# Patient Record
Sex: FEMALE | Race: WHITE | URBAN - METROPOLITAN AREA
[De-identification: names, ages, dates, MRNs, and addresses within clinical notes are randomized per-mention and may not be internally consistent; named-entity substitution may affect disease eponyms.]

---

## 2017-06-28 NOTE — PROGRESS NOTES
SUBJECTIVE:     Ingris is a 66 year old female wanting to establish care:   HPI:  PCP is infrequent. Sees Dr. Matty Singleton.  Working with alternative practitioners mostly.  Also has [facility] care management with Cheryl Raymond.  Two concerns:  1) Right knee pain- outer aspect of knee. No weight bearing pain.  Not a pain of motion of the knee.   2) Working with physical therapist at ortho rehab in [place] [facility]. Had a shoulder injury from Chiropractor. Poor lymph drainage. Lots of fight and flight response. Working on release.   History of health care:  - Lyme disease for 10 years.  Worked with Dr. Anton.  Lingering health problems.  Needs lots of sleep.     Chronic Fatigue and thinks she has MCAS [very limited with where she can go given reactions].  Has had bad experience with acupuncture and homeopathy.     Eats all organic and cooks     Worked in [place] for Five years and since January 2017 has moved back to [place]. He will start a new job [not a caretaker].   ROS:  General: fatigue  Head/Eyes: none  Ears/Nose/Throat: none  Cardiovascular: none  Respiratory: none  Gastrointestinal: none  Breast: none  Genitourinary: none  Musculoskeletal: knee pain  Skin: none  Neurological: none  Mental Health: none  Endocrine: none  PROBLEM LIST:  Patient Active Problem List   Diagnosis     ADRENAL INSUFFICIENCY     Hypothyroidism     Hyperlipidemia     Malaise and fatigue     RIGHT SHOULDER PAIN     Allergic rhinitis     SKIN ANOMALY NEC - skin tags     BENIGN AYLA SKIN TRUNK -seborrheic keratosis     Elevated blood pressure reading without diagnosis of hypertension     Essential hypertension     Lateral epicondylitis     JOINT PAIN-LOWER LEG -left iliotibial band     Leiomyoma of uterus     Family history of malignant neoplasm of gastrointestinal tract     Backache     Symptomatic menopausal or female climacteric states     Open wound of hip and thigh     Myalgia and myositis     Encephalopathy   OB/GYN HISTORY:    .  PAST MEDICAL HISTORY:  Past Medical History:   Diagnosis Date     Allergic rhinitis, cause unspecified allergist -[place]    nothing for the last year - chemical sensitivities, now using drops     Breast screening, unspecified      Corticoadrenal insufficiency     no bloodwork for 1 year, off steroids for about 9 months     Dysfunction of eustachian tube      Intestinal disaccharidase deficiencies and disaccharide malabsorption      Leiomyoma of uterus, unspecified      Lyme disease      Other and unspecified hyperlipidemia     borderline, not been on meds     Other disorder of menstruation and other abnormal bleeding from female genital tract 3/1998    no periods      Other malaise and fatigue      Pain in joint, shoulder region     b/l frozen shoulders, one unfrozen, right shoulder - frozen - still a problem not complete ROM     Pneumonia, organism      Special screening for osteoporosis      Syncope and collapse 1998    vasovagal reaction, tilt test - 2 episodes collapsing after 98 - same time of year May      Unspecified hypothyroidism    Life Style Modifiers:   Tobacco:  reports that she has never smoked. She has never used smokeless tobacco.   Alcohol:  reports that she does not drink alcohol.   Drug use:  reports that she does not use illicit drugs.                  Diet: organic  CAM Providers:      Naturopath: Yes, many herbals she cannot take    Chiropractor: Yes - shoulder injury    Acupuncture and homeopathy went poorly  PAST SURGICAL HISTORY:  Past Surgical History:   Procedure Laterality Date     C NONSPECIFIC PROCEDURE      hysteroscopy and US showed uterine fibroid     HC COLONOSCOPY THRU STOMA W BIOPSY/CAUTERY TUMOR/POLYP/LESION        REMOVE TONSILS/ADENOIDS,12+ Y/O     FAMILY HISTORY:  Family History   Problem Relation Age of Onset     Cardiovascular Father      aortic aneurysm -  in his 50s from the aneruysm     CANCER Mother      skin CA dx 70, alive currently in  "her 80s     Lipids Maternal Grandmother      dx 40's     Cancer - colorectal Maternal Grandfather      dx 60's     CANCER Paternal Grandfather      lung CA     CANCER Sister      skin CA dx 48     Depression Son      dx 20   SOCIAL HISTORY:  Son had TBI and knee injury and on full disability - age 33. Mold in her house.  Social History     Marital status:      Spouse name: Deni     Number of children: 1     Years of education: N/A     Occupational History     Retired       Social History Main Topics     Smoking status: Never Smoker     Smokeless tobacco: Never Used     Alcohol use No     Drug use: No     Sexual activity: Yes     Partners: Male     Birth control/ protection: Condom      Comment:   - Deni, son is Tom   MEDICATIONS:  Current Outpatient Prescriptions   Medication Sig Dispense Refill     Thyroid (NATURE-THROID) 65 MG TABS Take by mouth daily Take 1/4 tablet       ASA-APAP-Caff Buffered (VANQUISH PO) Take by mouth as needed       Probiotic Product (PROBIOTIC DAILY PO) Take by mouth daily       Licorice-Glycine (RHIZINATE 3X PO) Take 6 tablets by mouth daily       Digestive Enzymes (DIGEST II PO) Take 6 tablets by mouth daily       Cholecalciferol (VITAMIN D-3) 5000 UNITS TABS Take by mouth daily       vitamin A 26463 UNIT capsule Take 10,000 Units by mouth daily       Docosahexaenoic Acid (DHA OMEGA 3 PO) Take 900 mg by mouth daily       B Complex Vitamins (B COMPLEX PO) Take by mouth daily       Zinc 15 MG CAPS Take by mouth daily       Alpha Lipoic Acid 200 MG CAPS Take by mouth daily     ALLERGIES:  Compazine [prochlorperazine]; Ciprofloxacin; Clindamycin; Contrast dye; Lactose; Latex; Lincomycin hcl; Penicillins; Seasonal allergies; Shellfish allergy; Sulfa drugs; and Tetanus toxoid  VITALS:  Blood pressure 135/83, pulse 79, height 1.695 m (5' 6.75\"), weight 82.3 kg (181 lb 8 oz).  PHYSICAL EXAM:  Speech: regular rate and rhythm  Mood: appropriate mood  Affect: normal  Thought " process: appropriate  Psych motor changes: normal/none   30 minutes was spent in direct contact with the patient and > 50% of the time in patient education and coordination of care.  Diagnoses and associated orders for this visit:  Encounter to establish care      - consider annual/preventive exam - she will consider but likely would not do mammogram or immunizations.   Right knee pain, unspecified chronicity  -     SPORTS MEDICINE REFERRAL  Multiple chemical sensitivity syndrome, sequela        -     Works with PT for release        -     Ree Gutierrez - mold and mycotoxins

## 2017-06-29 ENCOUNTER — OFFICE VISIT (OUTPATIENT)
Dept: FAMILY MEDICINE | Facility: CLINIC | Age: 67
End: 2017-06-29
Attending: FAMILY MEDICINE
Payer: COMMERCIAL

## 2017-06-29 VITALS
DIASTOLIC BLOOD PRESSURE: 83 MMHG | BODY MASS INDEX: 28.49 KG/M2 | HEART RATE: 79 BPM | WEIGHT: 181.5 LBS | HEIGHT: 67 IN | SYSTOLIC BLOOD PRESSURE: 135 MMHG

## 2017-06-29 DIAGNOSIS — T78.40XS MULTIPLE CHEMICAL SENSITIVITY SYNDROME, SEQUELA: ICD-10-CM

## 2017-06-29 DIAGNOSIS — M25.561 RIGHT KNEE PAIN, UNSPECIFIED CHRONICITY: ICD-10-CM

## 2017-06-29 DIAGNOSIS — Z76.89 ENCOUNTER TO ESTABLISH CARE: Primary | ICD-10-CM

## 2017-06-29 PROCEDURE — 99213 OFFICE O/P EST LOW 20 MIN: CPT | Mod: ZF

## 2017-06-29 ASSESSMENT — PAIN SCALES - GENERAL: PAINLEVEL: NO PAIN (0)

## 2017-06-29 ASSESSMENT — ANXIETY QUESTIONNAIRES
GAD7 TOTAL SCORE: 2
2. NOT BEING ABLE TO STOP OR CONTROL WORRYING: NOT AT ALL
1. FEELING NERVOUS, ANXIOUS, OR ON EDGE: NOT AT ALL
5. BEING SO RESTLESS THAT IT IS HARD TO SIT STILL: NOT AT ALL
3. WORRYING TOO MUCH ABOUT DIFFERENT THINGS: NOT AT ALL
6. BECOMING EASILY ANNOYED OR IRRITABLE: MORE THAN HALF THE DAYS
7. FEELING AFRAID AS IF SOMETHING AWFUL MIGHT HAPPEN: NOT AT ALL

## 2017-06-29 ASSESSMENT — PATIENT HEALTH QUESTIONNAIRE - PHQ9: 5. POOR APPETITE OR OVEREATING: NOT AT ALL

## 2017-06-29 NOTE — PATIENT INSTRUCTIONS
[facility] sports medicine clinic [phone]: Denis; Fred Desai Morris, Olson, Stovitz and Sukhwinder    Continue with ortho rehab    Clarence Gutierrez on Molds: Mold & Mycotoxins: Current Evaluation and Treatment 2016

## 2017-06-29 NOTE — MR AVS SNAPSHOT
After Visit Summary   6/29/2017    Ingris Whitt    MRN: [id number]           Patient Information     Date Of Birth          1950        Visit Information        Provider Department      6/29/2017 10:40 AM Amy Lira MD [facility]        Today's Diagnoses     Encounter to establish care    -  1    Right knee pain, unspecified chronicity        Multiple chemical sensitivity syndrome, sequela          Care Instructions    [facility] sports medicine [facility] [phone]: Denis; Fred Desai Morris, Olson, Stovitz and Sukhwinder    Continue with ortho rehab    Clarence Gutierrez on Molds: Mold & Mycotoxins: Current Evaluation and Treatment 2016                 Follow-ups after your visit        Additional Services     SPORTS MEDICINE REFERRAL       Your provider has referred you to:  [facility]: Sports Medicine [facility] [phone]   [email or web address]    Please be aware that coverage of these services is subject to the terms and limitations of your health insurance plan.  Call member services at your health plan with any benefit or coverage questions.      Please bring the following to your appointment:    >>   Any x-rays, CTs or MRIs which have been performed.  Contact the facility where they were done to arrange for  prior to your scheduled appointment.    >>   List of current medications   >>   This referral request   >>   Any documents/labs given to you for this referral                  Who to contact     Please call your clinic at [phone] to:    Ask questions about your health    Make or cancel appointments    Discuss your medicines    Learn about your test results    Speak to your doctor   If you have compliments or concerns about an experience at your clinic, or if you wish to file a complaint, please contact [facility] Patient Relations at [phone] or email us at  [email or web address]         Additional Information About Your Visit        [employer] Information     [employer] gives you secure access to your electronic health record. If you see a primary care provider, you can also send messages to your care team and make appointments. If you have questions, please call your primary care clinic.  If you do not have a primary care provider, please call [phone] and they will assist you.      [employer] is an electronic gateway that provides easy, online access to your medical records. With [employer], you can request a clinic appointment, read your test results, renew a prescription or communicate with your care team.     To access your existing account, please contact your [facility] or call [phone] for assistance.        Care EveryWhere ID     This is your Care EveryWhere ID. This could be used by other organizations to access your [place] medical records  [id number]        Your Vitals Were     Pulse Height BMI (Body Mass Index)             79 1.695 m (5' 6.75\") 28.64 kg/m2          Blood Pressure from Last 3 Encounters:   06/29/17 135/83   06/11/14 116/68   03/05/14 132/84    Weight from Last 3 Encounters:   06/29/17 82.3 kg (181 lb 8 oz)   06/05/06 79.4 kg (175 lb)   06/01/06 78 kg (172 lb)              We Performed the Following     SPORTS MEDICINE REFERRAL        Primary Care Provider Office Phone # Fax #    Matty Singleton [phone]       INTERNAL MEDICINE [facility] [address] [place] [address]  [facility] [address]        Equal Access to Services     DA CHAPMAN : Hadii kaylin peng Soalie, waaxda luqadaha, qaybta kaalmada adina, ebony hudson. So [facility] [phone].    ATENCIÓN: Si habla español, tiene a verma disposición servicios gratuitos de asistencia lingüística. Llame al [phone].    We comply with applicable federal civil rights laws and [place] laws. We do not discriminate on " the basis of race, color, national origin, age, disability sex, sexual orientation or gender identity.            Thank you!     Thank you for choosing [facility]  for your care. Our goal is always to provide you with excellent care. Hearing back from our patients is one way we can continue to improve our services. Please take a few minutes to complete the written survey that you may receive in the mail after your visit with us. Thank you!             Your Updated Medication List - Protect others around you: Learn how to safely use, store and throw away your medicines at www.disposemymeds.org.          This list is accurate as of: 6/29/17 11:22 AM.  Always use your most recent med list.                   Brand Name Dispense Instructions for use Diagnosis    Alpha Lipoic Acid 200 MG Caps      Take by mouth daily        B COMPLEX PO      Take by mouth daily        DHA OMEGA 3 PO      Take 900 mg by mouth daily        DIGEST II PO      Take 6 tablets by mouth daily        NATURE-THROID 65 MG Tabs   Generic drug:  Thyroid      Take by mouth daily Take 1/4 tablet        PROBIOTIC DAILY PO      Take by mouth daily        RHIZINATE 3X PO      Take 6 tablets by mouth daily        VANQUISH PO      Take by mouth as needed        vitamin A 13770 UNIT capsule      Take 10,000 Units by mouth daily        Vitamin D-3 5000 UNITS Tabs      Take by mouth daily        Zinc 15 MG Caps      Take by mouth daily

## 2017-06-29 NOTE — LETTER
6/29/2017       RE: Ingris Whitt  [address]  [place] [place] [facility] [address]     Dear Colleague,    Thank you for referring your patient, Ingris Whitt, to the [facility] at [facility]. Please see a copy of my visit note below.    SUBJECTIVE:     Ingris is a 66 year old female wanting to establish care:   HPI:  PCP is infrequent. Sees Dr. Matty Singleton.  Working with alternative practitioners mostly.  Also has [facility] care management with Cheryl Raymond.  Two concerns:  1) Right knee pain- outer aspect of knee. No weight bearing pain.  Not a pain of motion of the knee.   2) [place] Had a shoulder injury from Chiropractor. Poor lymph drainage. Lots of fight and flight response. Working on release.   History of health care:  - Lyme disease for 10 years.  Worked with Dr. Anton.  Lingering health problems.  Needs lots of sleep.     Chronic Fatigue and thinks she has MCAS [very limited with where she can go given reactions].  Has had bad experience with acupuncture and homeopathy.     Eats all organic and cooks     Worked in [place] for Five years and since January 2017 has moved back to [place]. He will start a new job [not a caretaker].   ROS:  General: fatigue  Head/Eyes: none  Ears/Nose/Throat: none  Cardiovascular: none  Respiratory: none  Gastrointestinal: none  Breast: none  Genitourinary: none  Musculoskeletal: knee pain  Skin: none  Neurological: none  Mental Health: none  Endocrine: none  PROBLEM LIST:  Patient Active Problem List   Diagnosis     ADRENAL INSUFFICIENCY     Hypothyroidism     Hyperlipidemia     Malaise and fatigue     RIGHT SHOULDER PAIN     Allergic rhinitis     SKIN ANOMALY NEC - skin tags     BENIGN AYLA SKIN TRUNK -seborrheic keratosis     Elevated blood pressure reading without diagnosis of hypertension     Essential hypertension     Lateral epicondylitis     JOINT PAIN-LOWER  LEG -left iliotibial band     Leiomyoma of uterus     Family history of malignant neoplasm of gastrointestinal tract     Backache     Symptomatic menopausal or female climacteric states     Open wound of hip and thigh     Myalgia and myositis     Encephalopathy   OB/GYN HISTORY:   .  PAST MEDICAL HISTORY:  Past Medical History:   Diagnosis Date     Allergic rhinitis, cause unspecified allergist -[place]    nothing for the last year - chemical sensitivities, now using drops     Breast screening, unspecified      Corticoadrenal insufficiency     no bloodwork for 1 year, off steroids for about 9 months     Dysfunction of eustachian tube      Intestinal disaccharidase deficiencies and disaccharide malabsorption      Leiomyoma of uterus, unspecified      Lyme disease      Other and unspecified hyperlipidemia     borderline, not been on meds     Other disorder of menstruation and other abnormal bleeding from female genital tract 3/1998    no periods      Other malaise and fatigue      Pain in joint, shoulder region     b/l frozen shoulders, one unfrozen, right shoulder - frozen - still a problem not complete ROM     Pneumonia, organism      Special screening for osteoporosis      Syncope and collapse 1998    vasovagal reaction, tilt test - 2 episodes collapsing after 98 - same time of year May      Unspecified hypothyroidism    Life Style Modifiers:   Tobacco:  reports that she has never smoked. She has never used smokeless tobacco.   Alcohol:  reports that she does not drink alcohol.   Drug use:  reports that she does not use illicit drugs.                  Diet: organic  CAM Providers:      Naturopath: Yes, many herbals she cannot take    Chiropractor: Yes - shoulder injury    Acupuncture and homeopathy went poorly  PAST SURGICAL HISTORY:  Past Surgical History:   Procedure Laterality Date     C NONSPECIFIC PROCEDURE      hysteroscopy and US showed uterine fibroid     HC COLONOSCOPY THRU STOMA W  BIOPSY/CAUTERY TUMOR/POLYP/LESION        REMOVE TONSILS/ADENOIDS,12+ Y/O     FAMILY HISTORY:  Family History   Problem Relation Age of Onset     Cardiovascular Father      aortic aneurysm -  in his 50s from the aneruysm     CANCER Mother      skin CA dx 70, alive currently in her 80s     Lipids Maternal Grandmother      dx 40's     Cancer - colorectal Maternal Grandfather      dx 60's     CANCER Paternal Grandfather      lung CA     CANCER Sister      skin CA dx 48     Depression Son      dx 20   SOCIAL HISTORY:  Son had TBI and knee injury and on full disability - age 33. Mold in her house.  Social History     Marital status:      Spouse name: Deni     Number of children: 1     Years of education: N/A     Occupational History     Retired       Social History Main Topics     Smoking status: Never Smoker     Smokeless tobacco: Never Used     Alcohol use No     Drug use: No     Sexual activity: Yes     Partners: Male     Birth control/ protection: Condom      Comment:   - Deni, son is Tom   MEDICATIONS:  Current Outpatient Prescriptions   Medication Sig Dispense Refill     Thyroid (NATURE-THROID) 65 MG TABS Take by mouth daily Take 1/4 tablet       ASA-APAP-Caff Buffered (VANQUISH PO) Take by mouth as needed       Probiotic Product (PROBIOTIC DAILY PO) Take by mouth daily       Licorice-Glycine (RHIZINATE 3X PO) Take 6 tablets by mouth daily       Digestive Enzymes (DIGEST II PO) Take 6 tablets by mouth daily       Cholecalciferol (VITAMIN D-3) 5000 UNITS TABS Take by mouth daily       vitamin A 28344 UNIT capsule Take 10,000 Units by mouth daily       Docosahexaenoic Acid (DHA OMEGA 3 PO) Take 900 mg by mouth daily       B Complex Vitamins (B COMPLEX PO) Take by mouth daily       Zinc 15 MG CAPS Take by mouth daily       Alpha Lipoic Acid 200 MG CAPS Take by mouth daily     ALLERGIES:  Compazine [prochlorperazine]; Ciprofloxacin; Clindamycin; Contrast dye; Lactose; Latex; Lincomycin  "hcl; Penicillins; Seasonal allergies; Shellfish allergy; Sulfa drugs; and Tetanus toxoid  VITALS:  Blood pressure 135/83, pulse 79, height 1.695 m (5' 6.75\"), weight 82.3 kg (181 lb 8 oz).  PHYSICAL EXAM:  Speech: regular rate and rhythm  Mood: appropriate mood  Affect: normal  Thought process: appropriate  Psych motor changes: normal/none   30 minutes was spent in direct contact with the patient and > 50% of the time in patient education and coordination of care.  Diagnoses and associated orders for this visit:  Encounter to establish care      - consider annual/preventive exam - she will consider but likely would not do mammogram or immunizations.   Right knee pain, unspecified chronicity  -     SPORTS MEDICINE REFERRAL  Multiple chemical sensitivity syndrome, sequela        -     Works with PT for release        -     Read Clarence Gutierrez - mold and mycotoxins    Again, thank you for allowing me to participate in the care of your patient.      Sincerely,    Amy Lira MD      "

## 2017-06-30 ASSESSMENT — ANXIETY QUESTIONNAIRES: GAD7 TOTAL SCORE: 2

## 2017-06-30 ASSESSMENT — PATIENT HEALTH QUESTIONNAIRE - PHQ9: SUM OF ALL RESPONSES TO PHQ QUESTIONS 1-9: 6

## 2017-07-01 ENCOUNTER — HEALTH MAINTENANCE LETTER (OUTPATIENT)
Age: 67
End: 2017-07-01

## 2018-01-26 ENCOUNTER — TELEPHONE (OUTPATIENT)
Dept: FAMILY MEDICINE | Facility: CLINIC | Age: 68
End: 2018-01-26

## 2018-01-26 NOTE — TELEPHONE ENCOUNTER
Reason for Call:  Other appointment    Detailed comments: Patient is looking to establish care and was referred to Dr. Barnes by patient Lisa Walter. Would you give patient a call to discuss if you can take her on as a new patient. Thank you.    Phone Number Patient can be reached at: Cell number on file:    Telephone Information:   Mobile [phone]       Best Time: Anytime    Can we leave a detailed message on this number? YES    Call taken on 1/26/2018 at 3:03 PM by Tonny Araya

## 2018-01-29 NOTE — TELEPHONE ENCOUNTER
I'm sorry, but my practice is quite full. Please thank them for the offer and suggest team or another [facility] location.

## 2018-06-20 ENCOUNTER — OFFICE VISIT (OUTPATIENT)
Dept: FAMILY MEDICINE | Facility: CLINIC | Age: 68
End: 2018-06-20
Payer: COMMERCIAL

## 2018-06-20 VITALS
HEIGHT: 67 IN | DIASTOLIC BLOOD PRESSURE: 81 MMHG | HEART RATE: 82 BPM | WEIGHT: 187.75 LBS | TEMPERATURE: 97.5 F | OXYGEN SATURATION: 96 % | BODY MASS INDEX: 29.47 KG/M2 | SYSTOLIC BLOOD PRESSURE: 133 MMHG

## 2018-06-20 DIAGNOSIS — M25.551 BILATERAL HIP PAIN: ICD-10-CM

## 2018-06-20 DIAGNOSIS — M25.511 CHRONIC PAIN OF BOTH SHOULDERS: Primary | ICD-10-CM

## 2018-06-20 DIAGNOSIS — J30.2 ACUTE SEASONAL ALLERGIC RHINITIS, UNSPECIFIED TRIGGER: ICD-10-CM

## 2018-06-20 DIAGNOSIS — G89.29 CHRONIC PAIN OF BOTH SHOULDERS: Primary | ICD-10-CM

## 2018-06-20 DIAGNOSIS — M25.512 CHRONIC PAIN OF BOTH SHOULDERS: Primary | ICD-10-CM

## 2018-06-20 DIAGNOSIS — M54.2 CERVICALGIA: ICD-10-CM

## 2018-06-20 DIAGNOSIS — M25.552 BILATERAL HIP PAIN: ICD-10-CM

## 2018-06-20 ASSESSMENT — PAIN SCALES - GENERAL: PAINLEVEL: NO PAIN (0)

## 2018-06-20 NOTE — MR AVS SNAPSHOT
After Visit Summary   6/20/2018    Ingris Whitt    MRN: [id number]           Patient Information     Date Of Birth          1950        Visit Information        Provider Department      6/20/2018 8:40 AM Robyn Garnett MD [facility]        Today's Diagnoses     Chronic pain of both shoulders    -  1    Acute seasonal allergic rhinitis, unspecified trigger        Cervicalgia        Bilateral hip pain           Follow-ups after your visit        Additional Services     ORTHOPEDICS ADULT REFERRAL       Your provider has referred you to: [facility]: [facility]opa[facility] [phone]   [email or web address]      Please be aware that coverage of these services is subject to the terms and limitations of your health insurance plan.  Call member services at your health plan with any benefit or coverage questions.      Please bring the following to your appointment:    >>   Any x-rays, CTs or MRIs which have been performed.  Contact the facility where they were done to arrange for  prior to your scheduled appointment.    >>   List of current medications   >>   This referral request   >>   Any documents/labs given to you for this referral                  Who to contact     Please call your clinic at [phone] to:    Ask questions about your health    Make or cancel appointments    Discuss your medicines    Learn about your test results    Speak to your doctor            Additional Information About Your Visit        [employer] Information     e[employer]ift gives you secure access to your electronic health record. If you see a primary care provider, you can also send messages to your care team and make appointments. If you have questions, please call your primary care clinic.  If you do not have a primary care provider, please call [phone] and they will assist you.      e[employer]ift is an electronic gateway that provides easy, online access to your  "medical records. With [employer], you can request a clinic appointment, read your test results, renew a prescription or communicate with your care team.     To access your existing account, please contact your [facility] or call [phone] for assistance.        Care EveryWhere ID     This is your Care EveryWhere ID. This could be used by other organizations to access your [place] medical records  [id number]        Your Vitals Were     Pulse Temperature Height Pulse Oximetry BMI (Body Mass Index)       82 97.5  F (36.4  C) (Tympanic) 5' 6.73\" (169.5 cm) 96% 29.64 kg/m2        Blood Pressure from Last 3 Encounters:   06/20/18 133/81   06/29/17 135/83   06/11/14 116/68    Weight from Last 3 Encounters:   06/20/18 187 lb 12 oz (85.2 kg)   06/29/17 181 lb 8 oz (82.3 kg)   06/05/06 175 lb (79.4 kg)              We Performed the Following     ORTHOPEDICS ADULT REFERRAL        Primary Care Provider Office Phone # Fax #    Matty Singleton [phone]       INTERNAL MEDICINE [facility] [address] [facility] [address]  [facility] [address]        Equal Access to Services     DA CHAPMAN : Hadii kaylin pringleo Soalie, waaxda lumary, qaybta kaalmada adecarmelinayada, ebony hudson. So [facility] [phone].    ATENCIÓN: Si habla español, tiene a verma disposición servicios gratuitos de asistencia lingüística. Llame al [phone].    We comply with applicable federal civil rights laws and [place] laws. We do not discriminate on the basis of race, color, national origin, age, disability, sex, sexual orientation, or gender identity.            Thank you!     Thank you for choosing [facility]  for your care. Our goal is always to provide you with excellent care. Hearing back from our patients is one way we can continue to improve our services. Please take a few minutes to complete the written survey that you may receive in the mail after your visit with us. Thank you!      "        Your Updated Medication List - Protect others around you: Learn how to safely use, store and throw away your medicines at www.disposemymeds.org.          This list is accurate as of 6/20/18  9:20 AM.  Always use your most recent med list.                   Brand Name Dispense Instructions for use Diagnosis    Alpha Lipoic Acid 200 MG Caps      Take by mouth daily        B COMPLEX PO      Take by mouth daily        DHA OMEGA 3 PO      Take 900 mg by mouth daily        DIGEST II PO      Take 6 tablets by mouth daily        NATURE-THROID 65 MG Tabs   Generic drug:  Thyroid      Take by mouth daily Take 1/4 tablet        PROBIOTIC DAILY PO      Take by mouth daily        RHIZINATE 3X PO      Take 6 tablets by mouth daily        VANQUISH PO      Take by mouth as needed        vitamin A 95568 UNIT capsule      Take 10,000 Units by mouth daily        Vitamin D-3 5000 units Tabs      Take 2,000 Units by mouth daily        Zinc 15 MG Caps      Take by mouth daily

## 2018-06-20 NOTE — PROGRESS NOTES
"Ingris Whitt is a 67 year old female new to [facility]. She is here for the following issues:    Shoulder pain  Ingris is a 67 year old female who presents with right shoulder pain. This began in 2000 and at that time, she was told she had a \"frozen shoulder\".  She is right handed but does a lot of activities with her left now. She has done physical therapy without relief. When she was doing Jeremias Chi and  her pain became worse so she went to [facility] 2/2018 where she had X-rays. She saw Ed Li PA-C and he recommended she have shoulder replacement in 10 years and treat with an injection for the time being .The X-ray results showed advanced glenohumeral joint osteoarthritis with joint space narrowing and 1 cm osteophyte off of the inferior humeral head and mild AC joint degenerative joint disease. She did another 3 sessions of PT which were unhelpful. It was noted by the Ed Li that she has severe osteoarthritis.     For pain management, Ingris uses a TheraPatch overnight, if its really bad during the day she will use a heating bad. 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    She would like to get a second opinion about her shoulder. Of note, she reports she has an allergy to lidocaine (discovered at her dental visit).     Numbness in arms  She also reports a history of lyme disease. She gets numbness from her neck that radiates down her arms. Her neck numbness causes headaches with so much \"pain that she can't think.\"     Bilateral hip pain  She reports pain at both hips that radiates down her lateral thighs. She also has numbness at her lateral thighs. She has never been evaluated for her numbness. She denies weakness in her legs.     Allergies  Ingris has seasonal allergies in the spring. They have been worse this year.  She sees a homeopath for much of her care and uses " "alternative treatments for her symptoms. Typically her allergy symptoms are itchy watery eyes, sneezing, swollen lymph's and \"airway problems\". She doesn't take any medications but she will use lemon water and antihistamine foods.  She will also take augustina seltzer gold which helps. Her previous doctor recommended use of benadryl at night. She would prefer to find the root cause of her symptoms, \"not just a band aid effect\". In that light she is going to a [employer] program. She brings information about this program.      She had allergy testing done previously by Dr. Ortega in [facility]. Her main allergies are mold, dust, foods, and seasonal. Shellfish gives her hives. She reports adverse reaction (no hives, lip swelling or difficulty with breathing) with eating the following. soy, eggs (she can only eat eggs from chickens not raised on soy), dairy, gluten, tree nuts. She doesn't use antihistamines, she uses her homeopathic tools.     Patient Active Problem List   Diagnosis     ADRENAL INSUFFICIENCY     Hypothyroidism     Hyperlipidemia     Malaise and fatigue     RIGHT SHOULDER PAIN     Allergic rhinitis     SKIN ANOMALY NEC - skin tags     BENIGN AYLA SKIN TRUNK -seborrheic keratosis     Elevated blood pressure reading without diagnosis of hypertension     Essential hypertension     Lateral epicondylitis     JOINT PAIN-LOWER LEG -left iliotibial band     Leiomyoma of uterus     Family history of malignant neoplasm of gastrointestinal tract     Backache     Symptomatic menopausal or female climacteric states     Open wound of hip and thigh     Myalgia and myositis     Encephalopathy       Current Outpatient Prescriptions   Medication Sig Dispense Refill     B Complex Vitamins (B COMPLEX PO) Take by mouth daily       Cholecalciferol (VITAMIN D-3) 5000 UNITS TABS Take 2,000 Units by mouth daily        Thyroid (NATURE-THROID) 65 MG TABS Take by mouth daily Take 1/4 tablet       vitamin A 84182 UNIT " "capsule Take one capsule 3x per week         Allergies   Allergen Reactions     Compazine [Prochlorperazine] Palpitations     Ciprofloxacin Hives     Clindamycin Rash     Contrast Dye Difficulty breathing     overheated feeling     Lactose Diarrhea     Latex Swelling     Lincomycin Hcl      ulcerated GI tract     Penicillins Hives     Seasonal Allergies      Shellfish Allergy Swelling     Sulfa Drugs Hives     Tetanus Toxoid Hives      PMH, PSH, FH, medications, allergies and immunizations are updated this visit. She declines immunizations.     ROS  CONSTITUTIONAL:POSITIVE  for fatigue and weight gain, no fevers  INTEGUMENTARY/SKIN: fair skinned, sees dermatologist yearly for skin check  EYES: NEGATIVE for vision changes or irritation  ENT/MOUTH: POSITIVE for hx of seasonal allergies in spring, my other triggers  RESP:NEGATIVE for significant cough or SOB  BREAST: NEGATIVE for masses, tenderness or discharge  CV: NEGATIVE for chest pain, palpitations or peripheral edema  GI: NEGATIVE for nausea, abdominal pain, heartburn, or change in bowel habits  Neuro: some difficulty with memory  Psych: hx of depression  Endocrine: takes armour thyroid daily  MS: as above, shoulder, hip pain, neck pain    EXAM  /81 (BP Location: Right arm, Patient Position: Chair, Cuff Size: Adult Regular)  Pulse 82  Temp 97.5  F (36.4  C) (Tympanic)  Ht 5' 6.73\" (169.5 cm)  Wt 187 lb 12 oz (85.2 kg)  SpO2 96%  BMI 29.64 kg/m2  Gen: Alert, pleasant, NAD, overweight  HEENT:  Conjunctiva nl, TM normal bilaterally, OP clear, no posterior erythema  COR: S1,S2, no murmur  Lungs: CTA bilaterally, no rhonchi, wheezes or rales  Abdomen: Soft, non tender, normal bowel sounds, no HSM or mass  Ext: no edema  Musculoskeletal exams:  Neck: no tenderness over bony  T or LS spine Mild tenderness with palpation over lower bony C spine.  Neck flexion elicits some slight headedness.   Point tenderness with palpation over suboccipital muscles and " trapezius muscles bilaterally.  Shoulder: tenderness with palpation over right bicipital groove. Limited ROM of right shoulder.    Hips: point tenderness with palpation over the lateral hips and along the IT bands bilaterally.   Neuro: 2/4 reflexes at biceps bilaterally  Bursa tenderness bilaterally along IT bands.       Assessment:  (M25.511,  G89.29,  M25.512) Chronic pain of both shoulders  (primary encounter diagnosis)  Comment: hx of chronic shoulder pain, suspect frozen shoulder on right some bicipital tenderness as well  Plan: ORTHOPEDICS ADULT REFERRAL        Refer to orthopedics at the [facility] for evaluation and treatment    (J30.2) Acute seasonal allergic rhinitis, unspecified trigger  Comment: springtime symptoms, she is on homeopathic treatment, prefers not to take OTC antihistamines  Plan: continue per homeopath recommendations    (M54.2) Cervicalgia  Comment: remote hx of cervical neck pain, paracervical muscular pain, associated numbness in arms  Plan: ORTHOPEDICS ADULT REFERRAL        Refer to orthopedics for evaluation and treatment.     (M25.551,  M25.552) Bilateral hip pain  Comment: suspect trochanteric bursitis, IT band strain  Plan: she will discuss with ortho, would likely benefit from PT, stretches, ice over area.     Return as needed.  Total visit time today 40 minutes.  More than 50% of the time was spent in counseling and coordination of care.       Robyn Garnett MD  Internal Medicine/Pediatrics

## 2018-06-20 NOTE — NURSING NOTE
"67 year old  Chief Complaint   Patient presents with     Establish Care     Shoulder Pain     Went to an orthopedic physician and has been told she has a \"frozen shoulder\", but other orthopedic doctors have told her she has a different issue with her shoulder. Thinks she needs a shoulder replacement and wants cortisone shots in the mean time until she has an operation on her shoulder. Needs a referral for her shoulder.       Allergies     Wants to discuss allergy symptoms, sensitivities, and issues.        Blood pressure 133/81, pulse 82, temperature 97.5  F (36.4  C), temperature source Tympanic, height 5' 6.73\" (169.5 cm), weight 187 lb 12 oz (85.2 kg), SpO2 96 %. Body mass index is 29.64 kg/(m^2).  Patient Active Problem List   Diagnosis     ADRENAL INSUFFICIENCY     Hypothyroidism     Hyperlipidemia     Malaise and fatigue     RIGHT SHOULDER PAIN     Allergic rhinitis     SKIN ANOMALY NEC - skin tags     BENIGN AYLA SKIN TRUNK -seborrheic keratosis     Elevated blood pressure reading without diagnosis of hypertension     Essential hypertension     Lateral epicondylitis     JOINT PAIN-LOWER LEG -left iliotibial band     Leiomyoma of uterus     Family history of malignant neoplasm of gastrointestinal tract     Backache     Symptomatic menopausal or female climacteric states     Open wound of hip and thigh     Myalgia and myositis     Encephalopathy       Wt Readings from Last 2 Encounters:   06/20/18 187 lb 12 oz (85.2 kg)   06/29/17 181 lb 8 oz (82.3 kg)     BP Readings from Last 3 Encounters:   06/20/18 133/81   06/29/17 135/83   06/11/14 116/68         Current Outpatient Prescriptions   Medication     Thyroid (NATURE-THROID) 65 MG TABS     Alpha Lipoic Acid 200 MG CAPS     ASA-APAP-Caff Buffered (VANQUISH PO)     B Complex Vitamins (B COMPLEX PO)     Cholecalciferol (VITAMIN D-3) 5000 UNITS TABS     Digestive Enzymes (DIGEST II PO)     Docosahexaenoic Acid (DHA OMEGA 3 PO)     Licorice-Glycine (RHIZINATE 3X PO) "     Probiotic Product (PROBIOTIC DAILY PO)     vitamin A 45251 UNIT capsule     Zinc 15 MG CAPS     No current facility-administered medications for this visit.        Social History   Substance Use Topics     Smoking status: Never Smoker     Smokeless tobacco: Never Used     Alcohol use No       Health Maintenance Due   Topic Date Due     TETANUS Q10 YR  1950     PHQ-2 Q1 YR  09/10/1963     PAP Q3 YR  09/10/1964     HEPATITIS C SCREENING  09/10/1969     MAMMO Q1 YR  07/20/2005     ADVANCE DIRECTIVE PLANNING Q5 YRS  09/10/2006     TSH Q1 YEAR  06/01/2007     COLONOSCOPY Q5 YR  10/25/2009     LIPID SCREEN Q5 YR FEMALE (SYSTEM ASSIGNED)  06/01/2011     FALL RISK ASSESSMENT  09/10/2016     DEXA SCAN SCREENING (SYSTEM ASSIGNED)  09/10/2016     PNEUMOCOCCAL (1 of 2 - PCV13) 09/10/2016       No results found for: FEROZ Pickard MA  June 20, 2018 8:46 AM

## 2018-06-24 PROBLEM — M19.019 OSTEOARTHRITIS OF SHOULDER: Status: ACTIVE | Noted: 2018-06-24

## 2018-07-02 ENCOUNTER — MEDICAL CORRESPONDENCE (OUTPATIENT)
Dept: HEALTH INFORMATION MANAGEMENT | Facility: CLINIC | Age: 68
End: 2018-07-02

## 2018-07-07 ENCOUNTER — HEALTH MAINTENANCE LETTER (OUTPATIENT)
Age: 68
End: 2018-07-07

## 2019-09-28 ENCOUNTER — HEALTH MAINTENANCE LETTER (OUTPATIENT)
Age: 69
End: 2019-09-28

## 2020-03-15 ENCOUNTER — HEALTH MAINTENANCE LETTER (OUTPATIENT)
Age: 70
End: 2020-03-15

## 2021-01-10 ENCOUNTER — HEALTH MAINTENANCE LETTER (OUTPATIENT)
Age: 71
End: 2021-01-10

## 2021-05-08 ENCOUNTER — HEALTH MAINTENANCE LETTER (OUTPATIENT)
Age: 71
End: 2021-05-08

## 2021-10-23 ENCOUNTER — HEALTH MAINTENANCE LETTER (OUTPATIENT)
Age: 71
End: 2021-10-23

## 2022-02-12 ENCOUNTER — HEALTH MAINTENANCE LETTER (OUTPATIENT)
Age: 72
End: 2022-02-12

## 2022-06-04 ENCOUNTER — HEALTH MAINTENANCE LETTER (OUTPATIENT)
Age: 72
End: 2022-06-04

## 2022-10-10 ENCOUNTER — HEALTH MAINTENANCE LETTER (OUTPATIENT)
Age: 72
End: 2022-10-10

## 2023-02-18 ENCOUNTER — HEALTH MAINTENANCE LETTER (OUTPATIENT)
Age: 73
End: 2023-02-18

## 2023-06-10 ENCOUNTER — HEALTH MAINTENANCE LETTER (OUTPATIENT)
Age: 73
End: 2023-06-10

## 2024-03-16 ENCOUNTER — HEALTH MAINTENANCE LETTER (OUTPATIENT)
Age: 74
End: 2024-03-16